# Patient Record
Sex: MALE | Race: WHITE | NOT HISPANIC OR LATINO | Employment: OTHER | URBAN - METROPOLITAN AREA
[De-identification: names, ages, dates, MRNs, and addresses within clinical notes are randomized per-mention and may not be internally consistent; named-entity substitution may affect disease eponyms.]

---

## 2019-12-08 ENCOUNTER — OFFICE VISIT (OUTPATIENT)
Dept: RETAIL CLINIC | Facility: CLINIC | Age: 29
End: 2019-12-08

## 2019-12-08 VITALS
HEART RATE: 78 BPM | RESPIRATION RATE: 18 BRPM | DIASTOLIC BLOOD PRESSURE: 74 MMHG | SYSTOLIC BLOOD PRESSURE: 122 MMHG | OXYGEN SATURATION: 97 % | TEMPERATURE: 98.2 F

## 2019-12-08 DIAGNOSIS — J98.8 VIRAL RESPIRATORY ILLNESS: ICD-10-CM

## 2019-12-08 DIAGNOSIS — H66.92 ACUTE OTITIS MEDIA, LEFT: ICD-10-CM

## 2019-12-08 DIAGNOSIS — J01.10 ACUTE FRONTAL SINUSITIS, RECURRENCE NOT SPECIFIED: Primary | ICD-10-CM

## 2019-12-08 DIAGNOSIS — B97.89 VIRAL RESPIRATORY ILLNESS: ICD-10-CM

## 2019-12-08 PROCEDURE — 99203 OFFICE O/P NEW LOW 30 MIN: CPT | Performed by: NURSE PRACTITIONER

## 2019-12-08 RX ORDER — BENZONATATE 200 MG/1
200 CAPSULE ORAL 3 TIMES DAILY PRN
Qty: 15 CAPSULE | Refills: 0 | Status: SHIPPED | OUTPATIENT
Start: 2019-12-08 | End: 2019-12-13

## 2019-12-08 RX ORDER — AZITHROMYCIN 250 MG/1
TABLET, FILM COATED ORAL
Qty: 6 TABLET | Refills: 0 | Status: SHIPPED | OUTPATIENT
Start: 2019-12-08 | End: 2021-06-28

## 2019-12-08 RX ORDER — PREDNISONE 20 MG/1
20 TABLET ORAL DAILY
Qty: 5 TABLET | Refills: 0 | Status: SHIPPED | OUTPATIENT
Start: 2019-12-08 | End: 2019-12-13

## 2019-12-08 NOTE — PROGRESS NOTES
Subjective   Patient ID: Quinton Bustillos is a 29 y.o. male presents with   Chief Complaint   Patient presents with   • Cough     3 weeks       Cough   This is a new problem. The current episode started 1 to 4 weeks ago (3w). The problem has been unchanged. The problem occurs hourly. The cough is productive of sputum. Associated symptoms include chills, headaches, postnasal drip, a sore throat (from coughing) and shortness of breath. Pertinent negatives include no ear pain, fever (warm to touch, unmeasured), rhinorrhea or wheezing. The symptoms are aggravated by lying down (heat). Risk factors for lung disease include animal exposure and smoking/tobacco exposure. Treatments tried: tylenol severe cold. The treatment provided no relief. His past medical history is significant for bronchitis and pneumonia. There is no history of asthma.       Allergies   Allergen Reactions   • Penicillins Hives       The following portions of the patient's history were reviewed and updated as appropriate: allergies, current medications, past family history, past medical history, past social history, past surgical history and problem list.      Review of Systems   Constitutional: Positive for chills and fatigue. Negative for diaphoresis and fever (warm to touch, unmeasured).   HENT: Positive for congestion, postnasal drip, sinus pressure, sneezing and sore throat (from coughing). Negative for ear pain and rhinorrhea.    Respiratory: Positive for cough (mostly dry, productive at times) and shortness of breath. Negative for chest tightness and wheezing.    Cardiovascular: Negative.    Gastrointestinal: Positive for nausea. Negative for diarrhea and vomiting.   Neurological: Positive for headaches.       Objective     Vitals:    12/08/19 1222   BP: 122/74   Pulse: 78   Resp: 18   Temp: 98.2 °F (36.8 °C)   SpO2: 97%         Physical Exam   Constitutional: He is oriented to person, place, and time. He appears well-developed and well-nourished.  He does not appear ill. No distress.   HENT:   Head: Normocephalic.   Right Ear: Hearing, tympanic membrane, external ear and ear canal normal.   Left Ear: Hearing, external ear and ear canal normal. Tympanic membrane is erythematous.   Nose: Mucosal edema and sinus tenderness (ethmoid) present. No rhinorrhea. Right sinus exhibits frontal sinus tenderness. Left sinus exhibits frontal sinus tenderness.   Mouth/Throat: Mucous membranes are normal. Posterior oropharyngeal erythema present. No tonsillar exudate.   Eyes: Conjunctivae are normal.   Sclera white.   Neck: No tracheal deviation present.   Cardiovascular: Normal rate, regular rhythm, S1 normal, S2 normal and normal heart sounds.   Pulmonary/Chest: Effort normal and breath sounds normal. No accessory muscle usage. No respiratory distress.   Abdominal: Soft. Bowel sounds are normal. There is no tenderness.   Lymphadenopathy:     He has no cervical adenopathy.   Neurological: He is alert and oriented to person, place, and time.   Skin: Skin is warm and dry.   Vitals reviewed.        Quinton was seen today for cough.    Diagnoses and all orders for this visit:    Acute frontal sinusitis, recurrence not specified  -     azithromycin (ZITHROMAX) 250 MG tablet; Take 2 tablets the first day, then 1 tablet daily for 4 days.  -     predniSONE (DELTASONE) 20 MG tablet; Take 1 tablet by mouth Daily for 5 days.    Acute otitis media, left  -     azithromycin (ZITHROMAX) 250 MG tablet; Take 2 tablets the first day, then 1 tablet daily for 4 days.  -     predniSONE (DELTASONE) 20 MG tablet; Take 1 tablet by mouth Daily for 5 days.    Viral respiratory illness  -     benzonatate (TESSALON) 200 MG capsule; Take 1 capsule by mouth 3 (Three) Times a Day As Needed for Cough for up to 5 days.        Patient understands possible side effects of all medications ordered. Follow-up with Primary Care Physician in 48-72 hours if these symptoms worsen or fail to improve as anticipated.  Patient verbalizes understanding.    Sinusitis, Adult  Sinusitis is inflammation of your sinuses. Sinuses are hollow spaces in the bones around your face. Your sinuses are located:  · Around your eyes.  · In the middle of your forehead.  · Behind your nose.  · In your cheekbones.  Mucus normally drains out of your sinuses. When your nasal tissues become inflamed or swollen, mucus can become trapped or blocked. This allows bacteria, viruses, and fungi to grow, which leads to infection. Most infections of the sinuses are caused by a virus.  Sinusitis can develop quickly. It can last for up to 4 weeks (acute) or for more than 12 weeks (chronic). Sinusitis often develops after a cold.  What are the causes?  This condition is caused by anything that creates swelling in the sinuses or stops mucus from draining. This includes:  · Allergies.  · Asthma.  · Infection from bacteria or viruses.  · Deformities or blockages in your nose or sinuses.  · Abnormal growths in the nose (nasal polyps).  · Pollutants, such as chemicals or irritants in the air.  · Infection from fungi (rare).  What increases the risk?  You are more likely to develop this condition if you:  · Have a weak body defense system (immune system).  · Do a lot of swimming or diving.  · Overuse nasal sprays.  · Smoke.  What are the signs or symptoms?  The main symptoms of this condition are pain and a feeling of pressure around the affected sinuses. Other symptoms include:  · Stuffy nose or congestion.  · Thick drainage from your nose.  · Swelling and warmth over the affected sinuses.  · Headache.  · Upper toothache.  · A cough that may get worse at night.  · Extra mucus that collects in the throat or the back of the nose (postnasal drip).  · Decreased sense of smell and taste.  · Fatigue.  · A fever.  · Sore throat.  · Bad breath.  How is this diagnosed?  This condition is diagnosed based on:  · Your symptoms.  · Your medical history.  · A physical exam.  · Tests  to find out if your condition is acute or chronic. This may include:  ? Checking your nose for nasal polyps.  ? Viewing your sinuses using a device that has a light (endoscope).  ? Testing for allergies or bacteria.  ? Imaging tests, such as an MRI or CT scan.  In rare cases, a bone biopsy may be done to rule out more serious types of fungal sinus disease.  How is this treated?  Treatment for sinusitis depends on the cause and whether your condition is chronic or acute.  · If caused by a virus, your symptoms should go away on their own within 10 days. You may be given medicines to relieve symptoms. They include:  ? Medicines that shrink swollen nasal passages (topical intranasal decongestants).  ? Medicines that treat allergies (antihistamines).  ? A spray that eases inflammation of the nostrils (topical intranasal corticosteroids).  ? Rinses that help get rid of thick mucus in your nose (nasal saline washes).  · If caused by bacteria, your health care provider may recommend waiting to see if your symptoms improve. Most bacterial infections will get better without antibiotic medicine. You may be given antibiotics if you have:  ? A severe infection.  ? A weak immune system.  · If caused by narrow nasal passages or nasal polyps, you may need to have surgery.  Follow these instructions at home:  Medicines  · Take, use, or apply over-the-counter and prescription medicines only as told by your health care provider. These may include nasal sprays.  · If you were prescribed an antibiotic medicine, take it as told by your health care provider. Do not stop taking the antibiotic even if you start to feel better.  Hydrate and humidify    · Drink enough fluid to keep your urine pale yellow. Staying hydrated will help to thin your mucus.  · Use a cool mist humidifier to keep the humidity level in your home above 50%.  · Inhale steam for 10-15 minutes, 3-4 times a day, or as told by your health care provider. You can do this in  the bathroom while a hot shower is running.  · Limit your exposure to cool or dry air.  Rest  · Rest as much as possible.  · Sleep with your head raised (elevated).  · Make sure you get enough sleep each night.  General instructions    · Apply a warm, moist washcloth to your face 3-4 times a day or as told by your health care provider. This will help with discomfort.  · Wash your hands often with soap and water to reduce your exposure to germs. If soap and water are not available, use hand .  · Do not smoke. Avoid being around people who are smoking (secondhand smoke).  · Keep all follow-up visits as told by your health care provider. This is important.  Contact a health care provider if:  · You have a fever.  · Your symptoms get worse.  · Your symptoms do not improve within 10 days.  Get help right away if:  · You have a severe headache.  · You have persistent vomiting.  · You have severe pain or swelling around your face or eyes.  · You have vision problems.  · You develop confusion.  · Your neck is stiff.  · You have trouble breathing.  Summary  · Sinusitis is soreness and inflammation of your sinuses. Sinuses are hollow spaces in the bones around your face.  · This condition is caused by nasal tissues that become inflamed or swollen. The swelling traps or blocks the flow of mucus. This allows bacteria, viruses, and fungi to grow, which leads to infection.  · If you were prescribed an antibiotic medicine, take it as told by your health care provider. Do not stop taking the antibiotic even if you start to feel better.  · Keep all follow-up visits as told by your health care provider. This is important.  This information is not intended to replace advice given to you by your health care provider. Make sure you discuss any questions you have with your health care provider.  Document Released: 12/18/2006 Document Revised: 05/20/2019 Document Reviewed: 05/20/2019  [employer] Interactive Patient Education © 2019  [employer]  Otitis Media, Adult    Otitis media occurs when there is inflammation and fluid in the middle ear. Your middle ear is a part of the ear that contains bones for hearing as well as air that helps send sounds to your brain.  What are the causes?  This condition is caused by a blockage in the eustachian tube. This tube drains fluid from the ear to the back of the nose (nasopharynx). A blockage in this tube can be caused by an object or by swelling (edema) in the tube. Problems that can cause a blockage include:  · A cold or other upper respiratory infection.  · Allergies.  · An irritant, such as tobacco smoke.  · Enlarged adenoids. The adenoids are areas of soft tissue located high in the back of the throat, behind the nose and the roof of the mouth.  · A mass in the nasopharynx.  · Damage to the ear caused by pressure changes (barotrauma).  What are the signs or symptoms?  Symptoms of this condition include:  · Ear pain.  · A fever.  · Decreased hearing.  · A headache.  · Tiredness (lethargy).  · Fluid leaking from the ear.  · Ringing in the ear.  How is this diagnosed?  This condition is diagnosed with a physical exam. During the exam your health care provider will use an instrument called an otoscope to look into your ear and check for redness, swelling, and fluid. He or she will also ask about your symptoms.  Your health care provider may also order tests, such as:  · A test to check the movement of the eardrum (pneumatic otoscopy). This test is done by squeezing a small amount of air into the ear.  · A test that changes air pressure in the middle ear to check how well the eardrum moves and whether the eustachian tube is working (tympanogram).  How is this treated?  This condition usually goes away on its own within 3-5 days. But if the condition is caused by a bacteria infection and does not go away own its own, or keeps coming back, your health care provider may:  · Prescribe antibiotic medicines to  treat the infection.  · Prescribe or recommend medicines to control pain.  Follow these instructions at home:  · Take over-the-counter and prescription medicines only as told by your health care provider.  · If you were prescribed an antibiotic medicine, take it as told by your health care provider. Do not stop taking the antibiotic even if you start to feel better.  · Keep all follow-up visits as told by your health care provider. This is important.  Contact a health care provider if:  · You have bleeding from your nose.  · There is a lump on your neck.  · You are not getting better in 5 days.  · You feel worse instead of better.  Get help right away if:  · You have severe pain that is not controlled with medicine.  · You have swelling, redness, or pain around your ear.  · You have stiffness in your neck.  · A part of your face is paralyzed.  · The bone behind your ear (mastoid) is tender when you touch it.  · You develop a severe headache.  Summary  · Otitis media is redness, soreness, and swelling of the middle ear.  · This condition usually goes away on its own within 3-5 days.  · If the problem does not go away in 3-5 days, your health care provider may prescribe or recommend medicines to treat your symptoms.  · If you were prescribed an antibiotic medicine, take it as told by your health care provider.  This information is not intended to replace advice given to you by your health care provider. Make sure you discuss any questions you have with your health care provider.  Document Released: 09/22/2005 Document Revised: 12/08/2017 Document Reviewed: 12/08/2017  [employer] Interactive Patient Education © 2019 [employer]  Viral Respiratory Infection  A respiratory infection is an illness that affects part of the respiratory system, such as the lungs, nose, or throat. A respiratory infection that is caused by a virus is called a viral respiratory infection.  Common types of viral respiratory infections  include:  · A cold.  · The flu (influenza).  · A respiratory syncytial virus (RSV) infection.  What are the causes?  This condition is caused by a virus.  What are the signs or symptoms?  Symptoms of this condition include:  · A stuffy or runny nose.  · Yellow or green nasal discharge.  · A cough.  · Sneezing.  · Fatigue.  · Achy muscles.  · A sore throat.  · Sweating or chills.  · A fever.  · A headache.  How is this diagnosed?  This condition may be diagnosed based on:  · Your symptoms.  · A physical exam.  · Testing of nasal swabs.  How is this treated?  This condition may be treated with medicines, such as:  · Antiviral medicine. This may shorten the length of time a person has symptoms.  · Expectorants. These make it easier to cough up mucus.  · Decongestant nasal sprays.  · Acetaminophen or NSAIDs to relieve fever and pain.  Antibiotic medicines are not prescribed for viral infections. This is because antibiotics are designed to kill bacteria. They are not effective against viruses.  Follow these instructions at home:    Managing pain and congestion  · Take over-the-counter and prescription medicines only as told by your health care provider.  · If you have a sore throat, gargle with a salt-water mixture 3-4 times a day or as needed. To make a salt-water mixture, completely dissolve ½-1 tsp of salt in 1 cup of warm water.  · Use nose drops made from salt water to ease congestion and soften raw skin around your nose.  · Drink enough fluid to keep your urine pale yellow. This helps prevent dehydration and helps loosen up mucus.  General instructions  · Rest as much as possible.  · Do not drink alcohol.  · Do not use any products that contain nicotine or tobacco, such as cigarettes and e-cigarettes. If you need help quitting, ask your health care provider.  · Keep all follow-up visits as told by your health care provider. This is important.  How is this prevented?    · Get an annual flu shot. You may get the flu  shot in late summer, fall, or winter. Ask your health care provider when you should get your flu shot.  · Avoid exposing others to your respiratory infection.  ? Stay home from work or school as told by your health care provider.  ? Wash your hands with soap and water often, especially after you cough or sneeze. If soap and water are not available, use alcohol-based hand .  · Avoid contact with people who are sick during cold and flu season. This is generally fall and winter.  Contact a health care provider if:  · Your symptoms last for 10 days or longer.  · Your symptoms get worse over time.  · You have a fever.  · You have severe sinus pain in your face or forehead.  · The glands in your jaw or neck become very swollen.  Get help right away if you:  · Feel pain or pressure in your chest.  · Have shortness of breath.  · Faint or feel like you will faint.  · Have severe and persistent vomiting.  · Feel confused or disoriented.  Summary  · A respiratory infection is an illness that affects part of the respiratory system, such as the lungs, nose, or throat. A respiratory infection that is caused by a virus is called a viral respiratory infection.  · Common types of viral respiratory infections are a cold, influenza, and respiratory syncytial virus (RSV) infection.  · Symptoms of this condition include a stuffy or runny nose, cough, sneezing, fatigue, achy muscles, sore throat, and fevers or chills.  · Antibiotic medicines are not prescribed for viral infections. This is because antibiotics are designed to kill bacteria. They are not effective against viruses.  This information is not intended to replace advice given to you by your health care provider. Make sure you discuss any questions you have with your health care provider.  Document Released: 09/27/2006 Document Revised: 01/28/2019 Document Reviewed: 01/28/2019  [employer] Interactive Patient Education © 2019 [employer]

## 2019-12-08 NOTE — PATIENT INSTRUCTIONS
Sinusitis, Adult  Sinusitis is inflammation of your sinuses. Sinuses are hollow spaces in the bones around your face. Your sinuses are located:  · Around your eyes.  · In the middle of your forehead.  · Behind your nose.  · In your cheekbones.  Mucus normally drains out of your sinuses. When your nasal tissues become inflamed or swollen, mucus can become trapped or blocked. This allows bacteria, viruses, and fungi to grow, which leads to infection. Most infections of the sinuses are caused by a virus.  Sinusitis can develop quickly. It can last for up to 4 weeks (acute) or for more than 12 weeks (chronic). Sinusitis often develops after a cold.  What are the causes?  This condition is caused by anything that creates swelling in the sinuses or stops mucus from draining. This includes:  · Allergies.  · Asthma.  · Infection from bacteria or viruses.  · Deformities or blockages in your nose or sinuses.  · Abnormal growths in the nose (nasal polyps).  · Pollutants, such as chemicals or irritants in the air.  · Infection from fungi (rare).  What increases the risk?  You are more likely to develop this condition if you:  · Have a weak body defense system (immune system).  · Do a lot of swimming or diving.  · Overuse nasal sprays.  · Smoke.  What are the signs or symptoms?  The main symptoms of this condition are pain and a feeling of pressure around the affected sinuses. Other symptoms include:  · Stuffy nose or congestion.  · Thick drainage from your nose.  · Swelling and warmth over the affected sinuses.  · Headache.  · Upper toothache.  · A cough that may get worse at night.  · Extra mucus that collects in the throat or the back of the nose (postnasal drip).  · Decreased sense of smell and taste.  · Fatigue.  · A fever.  · Sore throat.  · Bad breath.  How is this diagnosed?  This condition is diagnosed based on:  · Your symptoms.  · Your medical history.  · A physical exam.  · Tests to find out if your condition is  acute or chronic. This may include:  ? Checking your nose for nasal polyps.  ? Viewing your sinuses using a device that has a light (endoscope).  ? Testing for allergies or bacteria.  ? Imaging tests, such as an MRI or CT scan.  In rare cases, a bone biopsy may be done to rule out more serious types of fungal sinus disease.  How is this treated?  Treatment for sinusitis depends on the cause and whether your condition is chronic or acute.  · If caused by a virus, your symptoms should go away on their own within 10 days. You may be given medicines to relieve symptoms. They include:  ? Medicines that shrink swollen nasal passages (topical intranasal decongestants).  ? Medicines that treat allergies (antihistamines).  ? A spray that eases inflammation of the nostrils (topical intranasal corticosteroids).  ? Rinses that help get rid of thick mucus in your nose (nasal saline washes).  · If caused by bacteria, your health care provider may recommend waiting to see if your symptoms improve. Most bacterial infections will get better without antibiotic medicine. You may be given antibiotics if you have:  ? A severe infection.  ? A weak immune system.  · If caused by narrow nasal passages or nasal polyps, you may need to have surgery.  Follow these instructions at home:  Medicines  · Take, use, or apply over-the-counter and prescription medicines only as told by your health care provider. These may include nasal sprays.  · If you were prescribed an antibiotic medicine, take it as told by your health care provider. Do not stop taking the antibiotic even if you start to feel better.  Hydrate and humidify    · Drink enough fluid to keep your urine pale yellow. Staying hydrated will help to thin your mucus.  · Use a cool mist humidifier to keep the humidity level in your home above 50%.  · Inhale steam for 10-15 minutes, 3-4 times a day, or as told by your health care provider. You can do this in the bathroom while a hot shower is  running.  · Limit your exposure to cool or dry air.  Rest  · Rest as much as possible.  · Sleep with your head raised (elevated).  · Make sure you get enough sleep each night.  General instructions    · Apply a warm, moist washcloth to your face 3-4 times a day or as told by your health care provider. This will help with discomfort.  · Wash your hands often with soap and water to reduce your exposure to germs. If soap and water are not available, use hand .  · Do not smoke. Avoid being around people who are smoking (secondhand smoke).  · Keep all follow-up visits as told by your health care provider. This is important.  Contact a health care provider if:  · You have a fever.  · Your symptoms get worse.  · Your symptoms do not improve within 10 days.  Get help right away if:  · You have a severe headache.  · You have persistent vomiting.  · You have severe pain or swelling around your face or eyes.  · You have vision problems.  · You develop confusion.  · Your neck is stiff.  · You have trouble breathing.  Summary  · Sinusitis is soreness and inflammation of your sinuses. Sinuses are hollow spaces in the bones around your face.  · This condition is caused by nasal tissues that become inflamed or swollen. The swelling traps or blocks the flow of mucus. This allows bacteria, viruses, and fungi to grow, which leads to infection.  · If you were prescribed an antibiotic medicine, take it as told by your health care provider. Do not stop taking the antibiotic even if you start to feel better.  · Keep all follow-up visits as told by your health care provider. This is important.  This information is not intended to replace advice given to you by your health care provider. Make sure you discuss any questions you have with your health care provider.  Document Released: 12/18/2006 Document Revised: 05/20/2019 Document Reviewed: 05/20/2019  [employer] Interactive Patient Education © 2019 [employer]  Otitis Media,  Adult    Otitis media occurs when there is inflammation and fluid in the middle ear. Your middle ear is a part of the ear that contains bones for hearing as well as air that helps send sounds to your brain.  What are the causes?  This condition is caused by a blockage in the eustachian tube. This tube drains fluid from the ear to the back of the nose (nasopharynx). A blockage in this tube can be caused by an object or by swelling (edema) in the tube. Problems that can cause a blockage include:  · A cold or other upper respiratory infection.  · Allergies.  · An irritant, such as tobacco smoke.  · Enlarged adenoids. The adenoids are areas of soft tissue located high in the back of the throat, behind the nose and the roof of the mouth.  · A mass in the nasopharynx.  · Damage to the ear caused by pressure changes (barotrauma).  What are the signs or symptoms?  Symptoms of this condition include:  · Ear pain.  · A fever.  · Decreased hearing.  · A headache.  · Tiredness (lethargy).  · Fluid leaking from the ear.  · Ringing in the ear.  How is this diagnosed?  This condition is diagnosed with a physical exam. During the exam your health care provider will use an instrument called an otoscope to look into your ear and check for redness, swelling, and fluid. He or she will also ask about your symptoms.  Your health care provider may also order tests, such as:  · A test to check the movement of the eardrum (pneumatic otoscopy). This test is done by squeezing a small amount of air into the ear.  · A test that changes air pressure in the middle ear to check how well the eardrum moves and whether the eustachian tube is working (tympanogram).  How is this treated?  This condition usually goes away on its own within 3-5 days. But if the condition is caused by a bacteria infection and does not go away own its own, or keeps coming back, your health care provider may:  · Prescribe antibiotic medicines to treat the  infection.  · Prescribe or recommend medicines to control pain.  Follow these instructions at home:  · Take over-the-counter and prescription medicines only as told by your health care provider.  · If you were prescribed an antibiotic medicine, take it as told by your health care provider. Do not stop taking the antibiotic even if you start to feel better.  · Keep all follow-up visits as told by your health care provider. This is important.  Contact a health care provider if:  · You have bleeding from your nose.  · There is a lump on your neck.  · You are not getting better in 5 days.  · You feel worse instead of better.  Get help right away if:  · You have severe pain that is not controlled with medicine.  · You have swelling, redness, or pain around your ear.  · You have stiffness in your neck.  · A part of your face is paralyzed.  · The bone behind your ear (mastoid) is tender when you touch it.  · You develop a severe headache.  Summary  · Otitis media is redness, soreness, and swelling of the middle ear.  · This condition usually goes away on its own within 3-5 days.  · If the problem does not go away in 3-5 days, your health care provider may prescribe or recommend medicines to treat your symptoms.  · If you were prescribed an antibiotic medicine, take it as told by your health care provider.  This information is not intended to replace advice given to you by your health care provider. Make sure you discuss any questions you have with your health care provider.  Document Released: 09/22/2005 Document Revised: 12/08/2017 Document Reviewed: 12/08/2017  [employer] Interactive Patient Education © 2019 [employer]  Viral Respiratory Infection  A respiratory infection is an illness that affects part of the respiratory system, such as the lungs, nose, or throat. A respiratory infection that is caused by a virus is called a viral respiratory infection.  Common types of viral respiratory infections include:  · A  cold.  · The flu (influenza).  · A respiratory syncytial virus (RSV) infection.  What are the causes?  This condition is caused by a virus.  What are the signs or symptoms?  Symptoms of this condition include:  · A stuffy or runny nose.  · Yellow or green nasal discharge.  · A cough.  · Sneezing.  · Fatigue.  · Achy muscles.  · A sore throat.  · Sweating or chills.  · A fever.  · A headache.  How is this diagnosed?  This condition may be diagnosed based on:  · Your symptoms.  · A physical exam.  · Testing of nasal swabs.  How is this treated?  This condition may be treated with medicines, such as:  · Antiviral medicine. This may shorten the length of time a person has symptoms.  · Expectorants. These make it easier to cough up mucus.  · Decongestant nasal sprays.  · Acetaminophen or NSAIDs to relieve fever and pain.  Antibiotic medicines are not prescribed for viral infections. This is because antibiotics are designed to kill bacteria. They are not effective against viruses.  Follow these instructions at home:    Managing pain and congestion  · Take over-the-counter and prescription medicines only as told by your health care provider.  · If you have a sore throat, gargle with a salt-water mixture 3-4 times a day or as needed. To make a salt-water mixture, completely dissolve ½-1 tsp of salt in 1 cup of warm water.  · Use nose drops made from salt water to ease congestion and soften raw skin around your nose.  · Drink enough fluid to keep your urine pale yellow. This helps prevent dehydration and helps loosen up mucus.  General instructions  · Rest as much as possible.  · Do not drink alcohol.  · Do not use any products that contain nicotine or tobacco, such as cigarettes and e-cigarettes. If you need help quitting, ask your health care provider.  · Keep all follow-up visits as told by your health care provider. This is important.  How is this prevented?    · Get an annual flu shot. You may get the flu shot in late  summer, fall, or winter. Ask your health care provider when you should get your flu shot.  · Avoid exposing others to your respiratory infection.  ? Stay home from work or school as told by your health care provider.  ? Wash your hands with soap and water often, especially after you cough or sneeze. If soap and water are not available, use alcohol-based hand .  · Avoid contact with people who are sick during cold and flu season. This is generally fall and winter.  Contact a health care provider if:  · Your symptoms last for 10 days or longer.  · Your symptoms get worse over time.  · You have a fever.  · You have severe sinus pain in your face or forehead.  · The glands in your jaw or neck become very swollen.  Get help right away if you:  · Feel pain or pressure in your chest.  · Have shortness of breath.  · Faint or feel like you will faint.  · Have severe and persistent vomiting.  · Feel confused or disoriented.  Summary  · A respiratory infection is an illness that affects part of the respiratory system, such as the lungs, nose, or throat. A respiratory infection that is caused by a virus is called a viral respiratory infection.  · Common types of viral respiratory infections are a cold, influenza, and respiratory syncytial virus (RSV) infection.  · Symptoms of this condition include a stuffy or runny nose, cough, sneezing, fatigue, achy muscles, sore throat, and fevers or chills.  · Antibiotic medicines are not prescribed for viral infections. This is because antibiotics are designed to kill bacteria. They are not effective against viruses.  This information is not intended to replace advice given to you by your health care provider. Make sure you discuss any questions you have with your health care provider.  Document Released: 09/27/2006 Document Revised: 01/28/2019 Document Reviewed: 01/28/2019  [employer] Interactive Patient Education © 2019 [employer]

## 2021-06-28 ENCOUNTER — OFFICE VISIT (OUTPATIENT)
Dept: FAMILY MEDICINE CLINIC | Facility: CLINIC | Age: 31
End: 2021-06-28

## 2021-06-28 VITALS
HEIGHT: 70 IN | WEIGHT: 289.2 LBS | HEART RATE: 98 BPM | BODY MASS INDEX: 41.4 KG/M2 | TEMPERATURE: 97 F | RESPIRATION RATE: 18 BRPM | OXYGEN SATURATION: 97 % | SYSTOLIC BLOOD PRESSURE: 110 MMHG | DIASTOLIC BLOOD PRESSURE: 72 MMHG

## 2021-06-28 DIAGNOSIS — M25.542 ARTHRALGIA OF BOTH HANDS: ICD-10-CM

## 2021-06-28 DIAGNOSIS — R20.2 PARESTHESIA OF BOTH HANDS: Primary | ICD-10-CM

## 2021-06-28 DIAGNOSIS — M25.541 ARTHRALGIA OF BOTH HANDS: ICD-10-CM

## 2021-06-28 PROCEDURE — 99213 OFFICE O/P EST LOW 20 MIN: CPT | Performed by: NURSE PRACTITIONER

## 2021-06-28 RX ORDER — MELOXICAM 7.5 MG/1
7.5 TABLET ORAL DAILY
Qty: 14 TABLET | Refills: 0 | Status: SHIPPED | OUTPATIENT
Start: 2021-06-28

## 2021-06-28 NOTE — PROGRESS NOTES
"Subjective     Quinton Bustillos is a 30 y.o.. male.     Pt here today to become established.     Pt here today with c/o shaye. Hands and forearm numbness for about 2 weeks. Pt stating he cut his left hand around the time symptoms started. Pt stating that is the only accident/trauma he can remember at the time symptoms started. Pt stating his hands hurt in winter and he does not notice his hands feeling cold frequently. Pt stating that about 6 yrs ago he had a fracture to one of his metacarpal bones in his right hand and he did not need surgery for that.       The following portions of the patient's history were reviewed and updated as appropriate: allergies, current medications, past family history, past medical history, past social history, past surgical history and problem list.    Past Medical History:   Diagnosis Date   • Metacarpal bone fracture 2015    right        History reviewed. No pertinent surgical history.    Review of Systems   Constitutional: Negative for fever.   Musculoskeletal: Positive for arthralgias (hand pain). Negative for neck pain.        No swelling to hands  , hands get banged up at times   Neurological: Positive for numbness.       Allergies   Allergen Reactions   • Penicillins Hives       Objective     Vitals:    06/28/21 1423   BP: 110/72   BP Location: Left arm   Patient Position: Sitting   Pulse: 98   Resp: 18   Temp: 97 °F (36.1 °C)   TempSrc: Oral   SpO2: 97%   Weight: 131 kg (289 lb 3.2 oz)   Height: 177.8 cm (70\")     Body mass index is 41.5 kg/m².    Physical Exam  Vitals reviewed.   HENT:      Head: Normocephalic.   Eyes:      Pupils: Pupils are equal, round, and reactive to light.   Cardiovascular:      Rate and Rhythm: Normal rate and regular rhythm.   Pulmonary:      Effort: Pulmonary effort is normal.      Breath sounds: Normal breath sounds.   Musculoskeletal:      Right hand: No swelling, deformity or lacerations. Normal range of motion. Normal strength. Normal " sensation. Normal capillary refill. Normal pulse.      Left hand: No swelling, deformity or lacerations. Normal range of motion. Normal strength. Normal sensation. Normal capillary refill. Normal pulse.      Comments: Pt stating where he feels ache is to base of thumb and to area; c/o tenderness to fingers when palpating; phalens test negative.   Skin:     General: Skin is warm and dry.   Neurological:      Mental Status: He is alert and oriented to person, place, and time.   Psychiatric:         Behavior: Behavior normal.           Current Outpatient Medications:   •  meloxicam (Mobic) 7.5 MG tablet, Take 1 tablet by mouth Daily., Disp: 14 tablet, Rfl: 0      Assessment/Plan   Diagnoses and all orders for this visit:    1. Paresthesia of both hands (Primary)  -     CBC & Differential  -     Vitamin B12  -     Vitamin D 25 Hydroxy  -     Iron Profile  -     TSH  -     Comprehensive metabolic panel    2. Arthralgia of both hands  -     meloxicam (Mobic) 7.5 MG tablet; Take 1 tablet by mouth Daily.  Dispense: 14 tablet; Refill: 0    Other orders  -     Cancel: Comprehensive metabolic panel        Patient Instructions   Will do labs for further evaluation    Concern for tendonitis, recommend reduction in use of hands, soft hand braces on in am and off in pm, stop using hand  exerciser, decrease strength of squeeze with hands when possible, start script for meloxicam, may do warm epsom salt water soaks as needed; discussed steroid pack in future if still having ache.    On next visit if no improvement noted with paresthesia will recommend EMG (even though phalens test negative, would like to further eval. For carpal tunnel), will discuss further will patient on next visit.       Return for follow up in one week for paresthsia/lab follow up.

## 2021-06-29 LAB
25(OH)D3+25(OH)D2 SERPL-MCNC: 26.2 NG/ML (ref 30–100)
ALBUMIN SERPL-MCNC: 4.4 G/DL (ref 4–5)
ALBUMIN/GLOB SERPL: 1.8 {RATIO} (ref 1.2–2.2)
ALP SERPL-CCNC: 80 IU/L (ref 48–121)
ALT SERPL-CCNC: 51 IU/L (ref 0–44)
AST SERPL-CCNC: 30 IU/L (ref 0–40)
BASOPHILS # BLD AUTO: 0 X10E3/UL (ref 0–0.2)
BASOPHILS NFR BLD AUTO: 1 %
BILIRUB SERPL-MCNC: 0.4 MG/DL (ref 0–1.2)
BUN SERPL-MCNC: 7 MG/DL (ref 6–20)
BUN/CREAT SERPL: 8 (ref 9–20)
CALCIUM SERPL-MCNC: 9.4 MG/DL (ref 8.7–10.2)
CHLORIDE SERPL-SCNC: 103 MMOL/L (ref 96–106)
CO2 SERPL-SCNC: 24 MMOL/L (ref 20–29)
CREAT SERPL-MCNC: 0.9 MG/DL (ref 0.76–1.27)
EOSINOPHIL # BLD AUTO: 0.1 X10E3/UL (ref 0–0.4)
EOSINOPHIL NFR BLD AUTO: 2 %
ERYTHROCYTE [DISTWIDTH] IN BLOOD BY AUTOMATED COUNT: 12.9 % (ref 11.6–15.4)
GLOBULIN SER CALC-MCNC: 2.5 G/DL (ref 1.5–4.5)
GLUCOSE SERPL-MCNC: 116 MG/DL (ref 65–99)
HCT VFR BLD AUTO: 44.1 % (ref 37.5–51)
HGB BLD-MCNC: 14.7 G/DL (ref 13–17.7)
IMM GRANULOCYTES # BLD AUTO: 0 X10E3/UL (ref 0–0.1)
IMM GRANULOCYTES NFR BLD AUTO: 0 %
IRON SATN MFR SERPL: 16 % (ref 15–55)
IRON SERPL-MCNC: 51 UG/DL (ref 38–169)
LYMPHOCYTES # BLD AUTO: 2.8 X10E3/UL (ref 0.7–3.1)
LYMPHOCYTES NFR BLD AUTO: 41 %
MCH RBC QN AUTO: 28.3 PG (ref 26.6–33)
MCHC RBC AUTO-ENTMCNC: 33.3 G/DL (ref 31.5–35.7)
MCV RBC AUTO: 85 FL (ref 79–97)
MONOCYTES # BLD AUTO: 0.5 X10E3/UL (ref 0.1–0.9)
MONOCYTES NFR BLD AUTO: 7 %
NEUTROPHILS # BLD AUTO: 3.4 X10E3/UL (ref 1.4–7)
NEUTROPHILS NFR BLD AUTO: 49 %
PLATELET # BLD AUTO: 203 X10E3/UL (ref 150–450)
POTASSIUM SERPL-SCNC: 4 MMOL/L (ref 3.5–5.2)
PROT SERPL-MCNC: 6.9 G/DL (ref 6–8.5)
RBC # BLD AUTO: 5.19 X10E6/UL (ref 4.14–5.8)
SODIUM SERPL-SCNC: 141 MMOL/L (ref 134–144)
TIBC SERPL-MCNC: 314 UG/DL (ref 250–450)
TSH SERPL DL<=0.005 MIU/L-ACNC: 1.58 UIU/ML (ref 0.45–4.5)
UIBC SERPL-MCNC: 263 UG/DL (ref 111–343)
VIT B12 SERPL-MCNC: 317 PG/ML (ref 232–1245)
WBC # BLD AUTO: 6.8 X10E3/UL (ref 3.4–10.8)

## 2021-06-29 NOTE — PATIENT INSTRUCTIONS
Will do labs for further evaluation    Concern for tendonitis, recommend reduction in use of hands, soft hand braces on in am and off in pm, stop using hand  exerciser, decrease strength of squeeze with hands when possible, start script for meloxicam, may do warm epsom salt water soaks as needed; discussed steroid pack in future if still having ache.    On next visit if no improvement noted with paresthesia will recommend EMG (even though phalens test negative, would like to further eval. For carpal tunnel), will discuss further will patient on next visit.

## 2021-07-09 ENCOUNTER — OFFICE VISIT (OUTPATIENT)
Dept: FAMILY MEDICINE CLINIC | Facility: CLINIC | Age: 31
End: 2021-07-09

## 2021-07-09 VITALS
RESPIRATION RATE: 18 BRPM | HEIGHT: 70 IN | DIASTOLIC BLOOD PRESSURE: 80 MMHG | OXYGEN SATURATION: 99 % | BODY MASS INDEX: 40.52 KG/M2 | HEART RATE: 90 BPM | SYSTOLIC BLOOD PRESSURE: 142 MMHG | WEIGHT: 283 LBS | TEMPERATURE: 98 F

## 2021-07-09 DIAGNOSIS — R20.2 PARESTHESIA OF BOTH HANDS: ICD-10-CM

## 2021-07-09 DIAGNOSIS — M25.541 ARTHRALGIA OF BOTH HANDS: ICD-10-CM

## 2021-07-09 DIAGNOSIS — M25.542 ARTHRALGIA OF BOTH HANDS: ICD-10-CM

## 2021-07-09 DIAGNOSIS — M77.8 TENDONITIS OF BOTH WRISTS: Primary | ICD-10-CM

## 2021-07-09 PROCEDURE — 99213 OFFICE O/P EST LOW 20 MIN: CPT | Performed by: NURSE PRACTITIONER

## 2021-07-09 NOTE — PROGRESS NOTES
"Subjective     Quinton Bustillos is a 30 y.o.. male.     Pt here today for follow up on Paresthesia and arthralgia of shaye. Hands. Pt stating in past week his pain has eased up and numbness has improved too. Pt stating he has been wearing a brace to each hand at night when sleeping and then uses one brace during daytime where he switches back and forth from one hand to other hand during day.  Pt stating he still has to shake his hands out during the day at times but not as bad as before. Pt stating he is taking mobic as prescribed.  Pt symptoms started about 3 weeks ago.       The following portions of the patient's history were reviewed and updated as appropriate: allergies, current medications, past family history, past medical history, past social history, past surgical history and problem list.    Past Medical History:   Diagnosis Date   • Metacarpal bone fracture 2015    right        No past surgical history on file.    Review of Systems   Musculoskeletal: Positive for arthralgias. Negative for joint swelling.        Night wears both  Day wears one at a time       Allergies   Allergen Reactions   • Penicillins Hives       Objective     Vitals:    07/09/21 1447   BP: 142/80   BP Location: Left arm   Patient Position: Sitting   Pulse: 90   Resp: 18   Temp: 98 °F (36.7 °C)   TempSrc: Oral   SpO2: 99%   Weight: 128 kg (283 lb)   Height: 177.8 cm (70\")     Body mass index is 40.61 kg/m².    Physical Exam  Vitals reviewed.   HENT:      Head: Normocephalic.   Eyes:      Pupils: Pupils are equal, round, and reactive to light.   Cardiovascular:      Rate and Rhythm: Normal rate and regular rhythm.   Pulmonary:      Effort: Pulmonary effort is normal.      Breath sounds: Normal breath sounds.   Musculoskeletal:      Right wrist: No swelling, deformity, effusion, lacerations, tenderness, bony tenderness or crepitus. Normal range of motion. Normal pulse.      Left wrist: No swelling, deformity, effusion, lacerations, " tenderness, bony tenderness or crepitus. Normal range of motion. Normal pulse.      Right hand: Tenderness present. No swelling, deformity, lacerations or bony tenderness. Normal range of motion. Normal strength. Normal sensation. Normal capillary refill. Normal pulse.      Left hand: Tenderness present. No swelling, deformity, lacerations or bony tenderness. Normal range of motion. Normal strength. Normal sensation. Normal capillary refill. Normal pulse.   Skin:     General: Skin is warm and dry.   Neurological:      Mental Status: He is alert and oriented to person, place, and time.   Psychiatric:         Behavior: Behavior normal.         Current Outpatient Medications:   •  meloxicam (Mobic) 7.5 MG tablet, Take 1 tablet by mouth Daily., Disp: 14 tablet, Rfl: 0    Recent Results (from the past 2016 hour(s))   CBC & Differential    Collection Time: 06/28/21  3:23 PM    Specimen: Blood   Result Value Ref Range    WBC 6.8 3.4 - 10.8 x10E3/uL    RBC 5.19 4.14 - 5.80 x10E6/uL    Hemoglobin 14.7 13.0 - 17.7 g/dL    Hematocrit 44.1 37.5 - 51.0 %    MCV 85 79 - 97 fL    MCH 28.3 26.6 - 33.0 pg    MCHC 33.3 31.5 - 35.7 g/dL    RDW 12.9 11.6 - 15.4 %    Platelets 203 150 - 450 x10E3/uL    Neutrophil Rel % 49 Not Estab. %    Lymphocyte Rel % 41 Not Estab. %    Monocyte Rel % 7 Not Estab. %    Eosinophil Rel % 2 Not Estab. %    Basophil Rel % 1 Not Estab. %    Neutrophils Absolute 3.4 1.4 - 7.0 x10E3/uL    Lymphocytes Absolute 2.8 0.7 - 3.1 x10E3/uL    Monocytes Absolute 0.5 0.1 - 0.9 x10E3/uL    Eosinophils Absolute 0.1 0.0 - 0.4 x10E3/uL    Basophils Absolute 0.0 0.0 - 0.2 x10E3/uL    Immature Granulocyte Rel % 0 Not Estab. %    Immature Grans Absolute 0.0 0.0 - 0.1 x10E3/uL   Vitamin B12    Collection Time: 06/28/21  3:23 PM    Specimen: Blood   Result Value Ref Range    Vitamin B-12 317 232 - 1,245 pg/mL   Vitamin D 25 Hydroxy    Collection Time: 06/28/21  3:23 PM    Specimen: Blood   Result Value Ref Range    25  Hydroxy, Vitamin D 26.2 (L) 30.0 - 100.0 ng/mL   Iron Profile    Collection Time: 06/28/21  3:23 PM    Specimen: Blood   Result Value Ref Range    TIBC 314 250 - 450 ug/dL    UIBC 263 111 - 343 ug/dL    Iron 51 38 - 169 ug/dL    Iron Saturation 16 15 - 55 %   TSH    Collection Time: 06/28/21  3:23 PM    Specimen: Blood   Result Value Ref Range    TSH 1.580 0.450 - 4.500 uIU/mL   Comprehensive metabolic panel    Collection Time: 06/28/21  3:23 PM    Specimen: Blood   Result Value Ref Range    Glucose 116 (H) 65 - 99 mg/dL    BUN 7 6 - 20 mg/dL    Creatinine 0.90 0.76 - 1.27 mg/dL    eGFR Non African Am 113 >59 mL/min/1.73    eGFR African Am 131 >59 mL/min/1.73    BUN/Creatinine Ratio 8 (L) 9 - 20    Sodium 141 134 - 144 mmol/L    Potassium 4.0 3.5 - 5.2 mmol/L    Chloride 103 96 - 106 mmol/L    Total CO2 24 20 - 29 mmol/L    Calcium 9.4 8.7 - 10.2 mg/dL    Total Protein 6.9 6.0 - 8.5 g/dL    Albumin 4.4 4.0 - 5.0 g/dL    Globulin 2.5 1.5 - 4.5 g/dL    A/G Ratio 1.8 1.2 - 2.2    Total Bilirubin 0.4 0.0 - 1.2 mg/dL    Alkaline Phosphatase 80 48 - 121 IU/L    AST (SGOT) 30 0 - 40 IU/L    ALT (SGPT) 51 (H) 0 - 44 IU/L         Assessment/Plan   Diagnoses and all orders for this visit:    1. Tendonitis of both wrists (Primary)    2. Paresthesia of both hands  -     EMG & Nerve Conduction Test; Future    3. Arthralgia of both hands        Patient Instructions   Discussed all recent lab work with pt today in office.    Recommend EMG at this time for further eval.    Continue wearing braces to shaye. Hands/wrists as much as possible  Complete mobic script as prescribed      Return if symptoms worsen or fail to improve.

## 2021-07-09 NOTE — PATIENT INSTRUCTIONS
Discussed all recent lab work with pt today in office.    Recommend EMG at this time for further eval.    Continue wearing braces to shaye. Hands/wrists as much as possible  Complete mobic script as prescribed

## 2021-07-15 ENCOUNTER — TELEPHONE (OUTPATIENT)
Dept: FAMILY MEDICINE CLINIC | Facility: CLINIC | Age: 31
End: 2021-07-15

## 2021-07-15 NOTE — TELEPHONE ENCOUNTER
Caller: Quinton Bustillos    Relationship: Self    Best call back number: [phone]       What was the call regarding: PATIENT CALLED AND STATED HE DID GET A EMG NERVE TEST SCHEDULED BUT THE SOONEST THEY COULD SCHEDULE HIM WAS NOT UNTIL October. THAT OFFICE INFORMED HIM TO LET PCP KNOW

## 2021-07-16 NOTE — TELEPHONE ENCOUNTER
Please call pt back and inform him that to keep appt for EMG in October; to continue wearing braces to both Hands/wrists as much as possible and to let me know if there is anything else I can do. I will call with results of EMG once available.   thanks

## 2021-10-14 ENCOUNTER — HOSPITAL ENCOUNTER (OUTPATIENT)
Dept: INFUSION THERAPY | Facility: HOSPITAL | Age: 31
Discharge: HOME | End: 2021-10-14
Admitting: PSYCHIATRY & NEUROLOGY

## 2021-10-14 DIAGNOSIS — R20.2 PARESTHESIA OF BOTH HANDS: ICD-10-CM

## 2021-10-14 PROCEDURE — 95886 MUSC TEST DONE W/N TEST COMP: CPT

## 2021-10-14 PROCEDURE — 95886 MUSC TEST DONE W/N TEST COMP: CPT | Performed by: PSYCHIATRY & NEUROLOGY

## 2021-10-14 PROCEDURE — 95911 NRV CNDJ TEST 9-10 STUDIES: CPT

## 2021-10-14 PROCEDURE — 95911 NRV CNDJ TEST 9-10 STUDIES: CPT | Performed by: PSYCHIATRY & NEUROLOGY

## 2021-10-14 NOTE — PROCEDURES
EMG and Nerve Conduction Studies    I.      Instrument used: Neuromax 1002  II.     Please see data sheets for tabular summary of NCS and details on methods, temperatures and lab standards.   III.    EMG muscles tested for upper extremity studies include the deltoid, biceps, triceps, pronator teres, extensor digitorum communis, first dorsal interosseous and abductor pollicis brevis.    IV.   EMG muscles tested for lower extremity studies include the vastus lateralis, tibialis anterior, peroneus longus, medial gastrocnemius and extensor digitorum brevis.    V.    Additional muscles tested as needed.  Paraspinal muscles tested as needed.   VI.   Please see data sheets for tabular summary of EMG findings.   VII. The complete report includes the data sheets.      Indication: Bilateral hand numbness and pain right greater than left  History: 31-year-old white male with bilateral hand numbness and pain right greater than left      Ht: 177.8 cm  Wt: 128 kg  HbA1C: No results found for: HGBA1C  TSH:   Lab Results   Component Value Date    TSH 1.580 06/28/2021       Technical summary:  Nerve conduction studies were obtained in both arms.  Skin temperatures were at least 32 °C measured on the palms.  Needle examination was obtained on selected muscles of both arms    Results:  1.  Prolonged median antidromic sensory distal latencies bilaterally at 4.9 ms on the left and 4.6 ms on the right with low amplitudes of 11.3 µV bilaterally.  2.  Normal ulnar antidromic sensory distal latencies and amplitudes bilaterally.  3.  Normal right radial sensory distal latency and amplitude.  4.  Severely prolonged median motor distal latencies bilaterally at 7.3 ms on the left and 6.6 ms on the right with normal conduction velocities and amplitudes bilaterally.  5.  Normal absolute left ulnar motor conduction velocity in the short segment across the elbow at 50 m/s with faster velocity below the elbow at 62.9 m/s.  The difference of 12.9 m/s  is a mild abnormality; however, there was no first dorsal interosseous or abductor digiti quinti muscle weakness present.  Normal distal latency and amplitudes.  Normal right ulnar motor conduction velocities, distal latency and amplitudes.  6.  Needle examination of selected muscles in both arms showed normal insertional activities throughout.  There were normal motor units and recruitment patterns throughout except in the abductor pollicis brevis showing a few large motor units with essentially normal recruitment.    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  Statistically there is a mild left ulnar neuropathy at the elbow however without first dorsal interosseous or abductor digiti quinti weakness this finding is of unclear significance.  There was no evidence of a cervical radiculopathy on either side by the study.  Study results were discussed with the patient.    Paras Gonzalez M.D.              Dictated utilizing Dragon dictation.

## 2021-10-20 DIAGNOSIS — R94.131 ABNORMAL EMG: ICD-10-CM

## 2021-10-20 DIAGNOSIS — G56.03 BILATERAL CARPAL TUNNEL SYNDROME: Primary | ICD-10-CM
